# Patient Record
Sex: FEMALE | Race: BLACK OR AFRICAN AMERICAN | Employment: FULL TIME | URBAN - METROPOLITAN AREA
[De-identification: names, ages, dates, MRNs, and addresses within clinical notes are randomized per-mention and may not be internally consistent; named-entity substitution may affect disease eponyms.]

---

## 2017-08-14 ENCOUNTER — OFFICE VISIT (OUTPATIENT)
Dept: FAMILY MEDICINE | Facility: CLINIC | Age: 47
End: 2017-08-14
Payer: COMMERCIAL

## 2017-08-14 VITALS — SYSTOLIC BLOOD PRESSURE: 130 MMHG | DIASTOLIC BLOOD PRESSURE: 89 MMHG | TEMPERATURE: 96.5 F

## 2017-08-14 DIAGNOSIS — Z71.84 TRAVEL ADVICE ENCOUNTER: Primary | ICD-10-CM

## 2017-08-14 DIAGNOSIS — Z23 NEED FOR VACCINATION: ICD-10-CM

## 2017-08-14 PROCEDURE — 90734 MENACWYD/MENACWYCRM VACC IM: CPT | Mod: GA | Performed by: NURSE PRACTITIONER

## 2017-08-14 PROCEDURE — 90632 HEPA VACCINE ADULT IM: CPT | Mod: GA | Performed by: NURSE PRACTITIONER

## 2017-08-14 PROCEDURE — 90691 TYPHOID VACCINE IM: CPT | Mod: GA | Performed by: NURSE PRACTITIONER

## 2017-08-14 PROCEDURE — 90471 IMMUNIZATION ADMIN: CPT | Mod: GA | Performed by: NURSE PRACTITIONER

## 2017-08-14 PROCEDURE — 99402 PREV MED CNSL INDIV APPRX 30: CPT | Mod: 25 | Performed by: NURSE PRACTITIONER

## 2017-08-14 PROCEDURE — 90717 YELLOW FEVER VACCINE SUBQ: CPT | Mod: GA | Performed by: NURSE PRACTITIONER

## 2017-08-14 PROCEDURE — 90472 IMMUNIZATION ADMIN EACH ADD: CPT | Mod: GA | Performed by: NURSE PRACTITIONER

## 2017-08-14 RX ORDER — ATOVAQUONE AND PROGUANIL HYDROCHLORIDE 250; 100 MG/1; MG/1
1 TABLET, FILM COATED ORAL DAILY
Qty: 30 TABLET | Refills: 0 | Status: SHIPPED | OUTPATIENT
Start: 2017-08-14

## 2017-08-14 RX ORDER — LORAZEPAM 0.5 MG/1
0.25-0.5 TABLET ORAL EVERY 8 HOURS PRN
Qty: 5 TABLET | Refills: 0 | Status: SHIPPED | OUTPATIENT
Start: 2017-08-14

## 2017-08-14 NOTE — MR AVS SNAPSHOT
After Visit Summary   8/14/2017    Dame Da    MRN: [id number]           Patient Information     Date Of Birth          1970        Visit Information        Provider Department      8/14/2017 12:30 PM Jessica Taylor APRN CNP [facility]        Today's Diagnoses     Travel advice encounter    -  1    Need for vaccination          Care Instructions    Today August 14, 2017 you received the    Hepatitis A Vaccine - Please return on 2/10/18 or later for your 2nd and final dose.    Yellow Fever (YF)    Meningococcal (Menactra) Vaccine    Typhoid - injectable. This vaccine is valid for two years.   .    These appointments can be made as a NURSE ONLY visit.    **It is very important for the vaccinations to be given on the scheduled day(s), this helps ensure you receive the full effectiveness of the vaccine.**    Please call [facility] with any questions [phone]    Thank you for visiting [facility]              Follow-ups after your visit        Who to contact     If you have questions or need follow up information about [facility]'s clinic visit or your schedule please contact [facility] directly at [phone].  Normal or non-critical lab and imaging results will be communicated to you by MyChart, letter or phone within 4 business days after the clinic has received the results. If you do not hear from us within 7 days, please contact the clinic through MyChart or phone. If you have a critical or abnormal lab result, we will notify you by phone as soon as possible.  Submit refill requests through [employer] or call your pharmacy and they will forward the refill request to us. Please allow 3 business days for your refill to be completed.          Additional Information About Your Visit        MyChart Information     [employer] lets you send messages to your doctor, view your test results, renew your prescriptions, schedule appointments and  "more. To sign up, go to [email or web address] . Click on \"Log in\" on the left side of the screen, which will take you to the Welcome page. Then click on \"Sign up Now\" on the right side of the page.     You will be asked to enter the access code listed below, as well as some personal information. Please follow the directions to create your username and password.     Your access code is: [email or web address]  Expires: 2017  1:39 PM     Your access code will  in 90 days. If you need help or a new code, please call your [facility] clinic or [phone].        Care EveryWhere ID     This is your Care EveryWhere ID. This could be used by other organizations to access your [facility] medical records  [id number]        Your Vitals Were     Temperature Last Period Breastfeeding?             96.5  F (35.8  C) (Oral) 2017 (Approximate) No          Blood Pressure from Last 3 Encounters:   17 130/89   12 119/72   11 127/75    Weight from Last 3 Encounters:   11 116 lb (52.6 kg)              We Performed the Following     C YELLOW FEVER IMMUNIZATION, LIVE, SQ (STAMARIL)     HEPA VACCINE ADULT IM     MENINGOCOCCAL VACCINE,IM (MENACTRA)     TYPHOID VACCINE, IM          Today's Medication Changes          These changes are accurate as of: 17  1:39 PM.  If you have any questions, ask your nurse or doctor.               Start taking these medicines.        Dose/Directions    atovaquone-proguanil 250-100 MG per tablet   Commonly known as:  MALARONE   Used for:  Travel advice encounter   Started by:  Jessica Taylor APRN CNP        Dose:  1 tablet   Take 1 tablet by mouth daily Start 2 days before exposure to Malaria and continue daily till  7 days after exposure.   Quantity:  30 tablet   Refills:  0            Where to get your medicines      These medications were sent to [facility] - [place], [place] - [address] AT [facility] [address], [facility] " [address]     Phone:  [phone]     atovaquone-proguanil 250-100 MG per tablet                Primary Care Provider    Physician No Ref-Primary       No address on file        Equal Access to Services     NEO STEVENSVIOLET : Jarrell karoline hull nata Meehan, wamoyda luqjeanne, jesusybta kaantonia perkins, mariola friedmanlinda anselmo. So [facility] [phone].    ATENCIÓN: Si habla español, tiene a thomas disposición servicios gratuitos de asistencia lingüística. Llame al [phone].    We comply with applicable federal civil rights laws and [place] laws. We do not discriminate on the basis of race, color, national origin, age, disability sex, sexual orientation or gender identity.            Thank you!     Thank you for choosing [facility]  for your care. Our goal is always to provide you with excellent care. Hearing back from our patients is one way we can continue to improve our services. Please take a few minutes to complete the written survey that you may receive in the mail after your visit with us. Thank you!             Your Updated Medication List - Protect others around you: Learn how to safely use, store and throw away your medicines at www.disposemymeds.org.          This list is accurate as of: 8/14/17  1:39 PM.  Always use your most recent med list.                   Brand Name Dispense Instructions for use Diagnosis    atovaquone-proguanil 250-100 MG per tablet    MALARONE    30 tablet    Take 1 tablet by mouth daily Start 2 days before exposure to Malaria and continue daily till  7 days after exposure.    Travel advice encounter       MIDOL PO      Take  by mouth.        PNV PO      Take  by mouth.        TYLENOL PO      Take 500 mg by mouth as needed.

## 2017-08-14 NOTE — PROGRESS NOTES
Nurse Note      Itinerary:  [facility]       Departure Date: 08/16/2017       Return Date: 10/18/2017      Length of Trip 2 months      Reason for Travel: Pleasure           Urban or rural: both      Accommodations: Family home        IMMUNIZATION HISTORY  Have you received any immunizations within the past 4 weeks?  No  Have you ever fainted from having your blood drawn or from an injection?  No  Have you ever had a fever reaction to vaccination?  No  Have you ever had any bad reaction or side effect from any vaccination?  No  Have you ever had hepatitis A or B vaccine?  No  Do you live (or work closely) with anyone who has AIDS, an AIDS-like condition, any other immune disorder or who is on chemotherapy for cancer?  No  Do you have a family history of immunodeficiency?  No  Have you received any injection of immune globulin or any blood products during the past 12 months?  No    Patient roomed by SAÚL Dietrich  Dame Da is a 46 year old female seen today with spouse  with child for counsultation for international travel to [facility] for [facility]  Visiting friends and relatives.  Patient will be departing in  2 day(s) and staying for   2 month(s) and  traveling with family member(s).      Patient itinerary :  will be in the urban region of [place] and possibly to other regions which presents risk for Malaria, Yellow Fever, Dengue Fever, Chikungungya, Zika,  Trypanosomiasis, Schistosomiasis, Rabies, Ebola, food borne illnesses, motor vehicle accidents, Typhoid, Leishmaniasis and Lassa Fever. exposure.      Patient's activities will include visiting friends and relatives.    Patient's country of birth is [facility],     Special medical concerns: Anxiety/depression  Pre-travel questionnaire was completed by patient and reviewed by provider.     Vitals: /89  Temp 96.5  F (35.8  C) (Oral)  LMP 08/01/2017 (Approximate)  Breastfeeding? No  BMI= There is no height or weight on file to calculate  BMI.    EXAM:  General:  Well-nourished, well-developed in no acute distress.  Appears to be stated age, interacts appropriately and expresses understanding of information given to patient.    Current Outpatient Prescriptions   Medication Sig Dispense Refill     atovaquone-proguanil (MALARONE) 250-100 MG per tablet Take 1 tablet by mouth daily Start 2 days before exposure to Malaria and continue daily till  7 days after exposure. 30 tablet 0     LORazepam (ATIVAN) 0.5 MG tablet Take 0.5-1 tablets (0.25-0.5 mg) by mouth every 8 hours as needed for anxiety Take 30 minutes prior to departure.  Do not operate a vehicle after taking this medication 5 tablet 0     Ibuprofen (MIDOL PO) Take  by mouth.       Acetaminophen (TYLENOL PO) Take 500 mg by mouth as needed.       Prenatal Vit w/Od-Mbcuvtysz-BH (PNV PO) Take  by mouth.       There is no problem list on file for this patient.    No Known Allergies      Immunizations discussed include:   Hepatitis A:  Ordered/given today, risks, benefits and side effects reviewed  Hepatitis B: Declined  reviewed managment of a animal bite or scratch (washing wound, seek medical care within 24 hours for post exposure prophylaxis )  Influenza: vaccine is not available  Typhoid: Ordered/given today, risks, benefits and side effects reviewed  Rabies: Insufficient time to vaccinate  Yellow Fever: Stamaril   Jap ephalitis: Not indicated  Meningococcus: Ordered/given today, risks, benefits and side effects reviewed  Tetanus/Diphtheria: Up to date  Measles/Mumps/Rubella: Up to date  Cholera: Not needed  Polio: Up to date  Pneumococcal: Under age of 65  Varicella: Up to date  Zostavax:  Not indicated  HPV:  Not indicated  TB:  Consider post travel     Stamaril Informed Consent    The patient was provided with a copy of the IRB-approved consent form and all questions were answered before the patient agreed to participate by signing the informed consent document.   A copy of the form was  provided to the patient.    Date: 08/14/2017  Consent Version Date: 05/10/2017  Consent Obtained by:  ELEAZAR ritchie NP  HIPAA:  Yes  HIPAA Authorization Signed Date: 08/14/2017      Inclusion/Exclusion Criteria:    (Similar to Yellow Fever-VAX)      The patient met all of the following inclusion criteria in order to be eligible for the Stamaril vaccination under this EAP (Expanded Access Investigational New Drug Program)           At increased risk for YF, including researchers, laboratory workers, vaccine production staff, and those who are traveling within 30 days to a YF-endemic region or to a country requiring proof of YF vaccination under IHRs (International Health Regulations)?       Yes     Patient is greater than or equal to 9 months of age on the day of vaccination?     Yes     Patient is greater than or equal to 18 years of age and signed and dated the Consent Forms?     Yes     Patient is < 18 years of age and parent(s)/guardian(s) signed and dated the Consent Forms?      Patient is 7 years to < 18 years of age and signed and dated the Assent form?        No Assent is required.  Patient is <7 years of age.     No      No      N/A     The patient did not meet any of the following criteria that would have excluded the patient from receiving the Stamaril vaccination under this EAP              Patient is less than 9 months of age.       No     The patient is breast-feeding and cannot stop nursing for at least 14 days after vaccination.    Note: Yellow Fever vaccine virus may be transmissible via breast milk by nursing mothers who are vaccinated during the final 2 weeks of pregnancy or post-partum.   Following transmission, infants may develop encephalitis.  The minimum time of discontinuation of breastfeeding for 14 days after vaccination is based on the expected clearance of live-attenuated vaccine virus.       No     The patient is immunosuppressed, whether congenital or idiopathic, including for example,  leukemia, lymphoma, other malignancies, and patients who are receiving immunosuppressant medications (e.g. Systemic corticosteroids [greater than the standard dose of topical or inhaled steroids], alkylating drugs, antimetabolites, of other cytotoxic or immunomodulatory drugs) or radiation therapy or organ transplantation.       No     The patient has known hypersensitivity to the active substance or to any of the excipients of Stamaril vaccine or to eggs or chicken proteins.     No     The patient is symptomatic for human immunodeficiency virus (HIV) infection     No     The patient is asymptomatic for HIV infection but accompanied by evidence of severe immune suppression    Note:  Evidence of severe immune suppression includes CD4+ T-cell counts < 200 cubic millimeters (or < 15% total lymphocytes in children aged < 6 years), or as determined by the health care provider.       No     The patient has a history of thymus dysfunction (including myasthenia gravis, thymoma, thymectomy)     No     Moderate or severe febrile illness or acute illness    Note: Participation in the EAP can be reassessed when moderate or severe febrile illness or acute illness has resolved.       No         Altitude Exposure on this trip: no  Past tolerance to Altitude: na    ASSESSMENT/PLAN:    ICD-10-CM    1. Travel advice encounter Z71.89 C YELLOW FEVER IMMUNIZATION, LIVE, SQ (STAMARIL)     TYPHOID VACCINE, IM     HEPA VACCINE ADULT IM     MENINGOCOCCAL VACCINE,IM (MENACTRA)     atovaquone-proguanil (MALARONE) 250-100 MG per tablet     LORazepam (ATIVAN) 0.5 MG tablet   2. Need for vaccination Z23 C YELLOW FEVER IMMUNIZATION, LIVE, SQ (STAMARIL)     TYPHOID VACCINE, IM     HEPA VACCINE ADULT IM     MENINGOCOCCAL VACCINE,IM (MENACTRA)     I have reviewed general recommendations for safe travel   including: food/water precautions, insect precautions, safer sex   practices given high prevalence of Zika, HIV and other STDs,   roadway safety.  Educational materials and [employer] report provided.    Malaraia prophylaxis recommended: Malarone  Symptomatic treatment for traveler's diarrhea: azithromycin  Altitude illness prevention and treatment: no      Evacuation insurance advised and resources were provided to patient.    Total visit time 30 minutes  with over 50% of time spent counseling patient as detailed above.    Jessica Taylor CNP

## 2017-08-14 NOTE — PATIENT INSTRUCTIONS
Today August 14, 2017 you received the    Hepatitis A Vaccine - Please return on 2/10/18 or later for your 2nd and final dose.    Yellow Fever (YF)    Meningococcal (Menactra) Vaccine    Typhoid - injectable. This vaccine is valid for two years.   .    These appointments can be made as a NURSE ONLY visit.    **It is very important for the vaccinations to be given on the scheduled day(s), this helps ensure you receive the full effectiveness of the vaccine.**    Please call [facility] with any questions [phone]    Thank you for visiting [facility]

## 2020-03-18 ENCOUNTER — HOSPITAL ENCOUNTER (EMERGENCY)
Facility: CLINIC | Age: 50
Discharge: HOME | End: 2020-03-18
Attending: FAMILY MEDICINE | Admitting: FAMILY MEDICINE
Payer: COMMERCIAL

## 2020-03-18 ENCOUNTER — APPOINTMENT (OUTPATIENT)
Dept: CT IMAGING | Facility: CLINIC | Age: 50
End: 2020-03-18
Attending: FAMILY MEDICINE
Payer: COMMERCIAL

## 2020-03-18 VITALS
OXYGEN SATURATION: 100 % | WEIGHT: 122 LBS | RESPIRATION RATE: 17 BRPM | SYSTOLIC BLOOD PRESSURE: 142 MMHG | BODY MASS INDEX: 23.05 KG/M2 | TEMPERATURE: 98.1 F | DIASTOLIC BLOOD PRESSURE: 82 MMHG

## 2020-03-18 DIAGNOSIS — G44.209 ACUTE NON INTRACTABLE TENSION-TYPE HEADACHE: ICD-10-CM

## 2020-03-18 DIAGNOSIS — R03.0 ELEVATED BLOOD PRESSURE READING WITHOUT DIAGNOSIS OF HYPERTENSION: ICD-10-CM

## 2020-03-18 LAB
ALBUMIN SERPL-MCNC: 3.8 G/DL (ref 3.4–5)
ALP SERPL-CCNC: 65 U/L (ref 40–150)
ALT SERPL W P-5'-P-CCNC: 27 U/L (ref 0–50)
ANION GAP SERPL CALCULATED.3IONS-SCNC: 4 MMOL/L (ref 3–14)
AST SERPL W P-5'-P-CCNC: 19 U/L (ref 0–45)
BASOPHILS # BLD AUTO: 0 10E9/L (ref 0–0.2)
BASOPHILS NFR BLD AUTO: 0.2 %
BILIRUB SERPL-MCNC: 0.3 MG/DL (ref 0.2–1.3)
BUN SERPL-MCNC: 8 MG/DL (ref 7–30)
CALCIUM SERPL-MCNC: 9.4 MG/DL (ref 8.5–10.1)
CHLORIDE SERPL-SCNC: 107 MMOL/L (ref 94–109)
CO2 SERPL-SCNC: 31 MMOL/L (ref 20–32)
CREAT SERPL-MCNC: 0.55 MG/DL (ref 0.52–1.04)
DIFFERENTIAL METHOD BLD: ABNORMAL
EOSINOPHIL # BLD AUTO: 0.1 10E9/L (ref 0–0.7)
EOSINOPHIL NFR BLD AUTO: 3 %
ERYTHROCYTE [DISTWIDTH] IN BLOOD BY AUTOMATED COUNT: 16.7 % (ref 10–15)
ERYTHROCYTE [SEDIMENTATION RATE] IN BLOOD BY WESTERGREN METHOD: 12 MM/H (ref 0–20)
GFR SERPL CREATININE-BSD FRML MDRD: >90 ML/MIN/{1.73_M2}
GLUCOSE SERPL-MCNC: 85 MG/DL (ref 70–99)
HCG UR QL: NEGATIVE
HCT VFR BLD AUTO: 40.6 % (ref 35–47)
HGB BLD-MCNC: 12.9 G/DL (ref 11.7–15.7)
IMM GRANULOCYTES # BLD: 0 10E9/L (ref 0–0.4)
IMM GRANULOCYTES NFR BLD: 0.2 %
INTERNAL QC OK POCT: YES
LYMPHOCYTES # BLD AUTO: 1.9 10E9/L (ref 0.8–5.3)
LYMPHOCYTES NFR BLD AUTO: 42.4 %
MCH RBC QN AUTO: 27.9 PG (ref 26.5–33)
MCHC RBC AUTO-ENTMCNC: 31.8 G/DL (ref 31.5–36.5)
MCV RBC AUTO: 88 FL (ref 78–100)
MONOCYTES # BLD AUTO: 0.2 10E9/L (ref 0–1.3)
MONOCYTES NFR BLD AUTO: 4.6 %
NEUTROPHILS # BLD AUTO: 2.2 10E9/L (ref 1.6–8.3)
NEUTROPHILS NFR BLD AUTO: 49.6 %
NRBC # BLD AUTO: 0 10*3/UL
NRBC BLD AUTO-RTO: 0 /100
PLATELET # BLD AUTO: 183 10E9/L (ref 150–450)
POTASSIUM SERPL-SCNC: 3.7 MMOL/L (ref 3.4–5.3)
PROT SERPL-MCNC: 7.5 G/DL (ref 6.8–8.8)
RBC # BLD AUTO: 4.63 10E12/L (ref 3.8–5.2)
SODIUM SERPL-SCNC: 142 MMOL/L (ref 133–144)
TROPONIN I SERPL-MCNC: <0.015 UG/L (ref 0–0.04)
WBC # BLD AUTO: 4.4 10E9/L (ref 4–11)

## 2020-03-18 PROCEDURE — 96361 HYDRATE IV INFUSION ADD-ON: CPT | Performed by: FAMILY MEDICINE

## 2020-03-18 PROCEDURE — 85025 COMPLETE CBC W/AUTO DIFF WBC: CPT | Performed by: FAMILY MEDICINE

## 2020-03-18 PROCEDURE — 81025 URINE PREGNANCY TEST: CPT | Performed by: FAMILY MEDICINE

## 2020-03-18 PROCEDURE — 99285 EMERGENCY DEPT VISIT HI MDM: CPT | Mod: 25 | Performed by: FAMILY MEDICINE

## 2020-03-18 PROCEDURE — 80053 COMPREHEN METABOLIC PANEL: CPT | Performed by: FAMILY MEDICINE

## 2020-03-18 PROCEDURE — 96374 THER/PROPH/DIAG INJ IV PUSH: CPT | Performed by: FAMILY MEDICINE

## 2020-03-18 PROCEDURE — 25000128 H RX IP 250 OP 636: Performed by: FAMILY MEDICINE

## 2020-03-18 PROCEDURE — 84484 ASSAY OF TROPONIN QUANT: CPT | Performed by: FAMILY MEDICINE

## 2020-03-18 PROCEDURE — 99284 EMERGENCY DEPT VISIT MOD MDM: CPT | Mod: Z6 | Performed by: FAMILY MEDICINE

## 2020-03-18 PROCEDURE — 96376 TX/PRO/DX INJ SAME DRUG ADON: CPT | Performed by: FAMILY MEDICINE

## 2020-03-18 PROCEDURE — 70450 CT HEAD/BRAIN W/O DYE: CPT

## 2020-03-18 PROCEDURE — 96375 TX/PRO/DX INJ NEW DRUG ADDON: CPT | Performed by: FAMILY MEDICINE

## 2020-03-18 PROCEDURE — 85652 RBC SED RATE AUTOMATED: CPT | Performed by: FAMILY MEDICINE

## 2020-03-18 PROCEDURE — 25800030 ZZH RX IP 258 OP 636: Performed by: FAMILY MEDICINE

## 2020-03-18 RX ORDER — METOCLOPRAMIDE HYDROCHLORIDE 5 MG/ML
5 INJECTION INTRAMUSCULAR; INTRAVENOUS ONCE
Status: COMPLETED | OUTPATIENT
Start: 2020-03-18 | End: 2020-03-18

## 2020-03-18 RX ORDER — KETOROLAC TROMETHAMINE 15 MG/ML
15 INJECTION, SOLUTION INTRAMUSCULAR; INTRAVENOUS ONCE
Status: COMPLETED | OUTPATIENT
Start: 2020-03-18 | End: 2020-03-18

## 2020-03-18 RX ORDER — HYDROMORPHONE HCL/0.9% NACL/PF 0.2MG/0.2
0.2 SYRINGE (ML) INTRAVENOUS ONCE
Status: COMPLETED | OUTPATIENT
Start: 2020-03-18 | End: 2020-03-18

## 2020-03-18 RX ORDER — DEXAMETHASONE SODIUM PHOSPHATE 10 MG/ML
10 INJECTION, SOLUTION INTRAMUSCULAR; INTRAVENOUS ONCE
Status: COMPLETED | OUTPATIENT
Start: 2020-03-18 | End: 2020-03-18

## 2020-03-18 RX ORDER — SODIUM CHLORIDE 9 MG/ML
1000 INJECTION, SOLUTION INTRAVENOUS CONTINUOUS
Status: DISCONTINUED | OUTPATIENT
Start: 2020-03-18 | End: 2020-03-18 | Stop reason: HOSPADM

## 2020-03-18 RX ADMIN — KETOROLAC TROMETHAMINE 15 MG: 15 INJECTION, SOLUTION INTRAMUSCULAR; INTRAVENOUS at 13:44

## 2020-03-18 RX ADMIN — SODIUM CHLORIDE 1000 ML: 9 INJECTION, SOLUTION INTRAVENOUS at 11:41

## 2020-03-18 RX ADMIN — METOCLOPRAMIDE 5 MG: 5 INJECTION, SOLUTION INTRAMUSCULAR; INTRAVENOUS at 11:42

## 2020-03-18 RX ADMIN — Medication 0.2 MG: at 13:52

## 2020-03-18 RX ADMIN — DEXAMETHASONE SODIUM PHOSPHATE 10 MG: 10 INJECTION, SOLUTION INTRAMUSCULAR; INTRAVENOUS at 13:44

## 2020-03-18 RX ADMIN — Medication 0.2 MG: at 11:42

## 2020-03-18 NOTE — ED TRIAGE NOTES
Pt arrives with headache that started on Friday, that continues to get worse. She has been taking tylonel and advil, but does not help. First time having a heachache like this.

## 2020-03-18 NOTE — ED AVS SNAPSHOT
[facility], [place], Emergency Department  [address]  [facility] [address]  Phone:  [phone]  Fax:  [phone]                                    Dame Da   MRN: [id number]    Department:  [facility], Emergency Department   Date of Visit:  3/18/2020           After Visit Summary Signature Page    I have received my discharge instructions, and my questions have been answered. I have discussed any challenges I see with this plan with the nurse or doctor.    ..........................................................................................................................................  Patient/Patient Representative Signature      ..........................................................................................................................................  Patient Representative Print Name and Relationship to Patient    ..................................................               ................................................  Date                                   Time    ..........................................................................................................................................  Reviewed by Signature/Title    ...................................................              ..............................................  Date                                               Time          22EPIC Rev 08/18

## 2020-03-18 NOTE — LETTER
March 18, 2020      To Whom It May Concern:      Dame Da was seen in our Emergency Department today, 03/18/20.  I expect her condition to improve over the next 1-2 days.  She may return to work/school when improved.    Sincerely,        Jose Hernandez MD

## 2020-03-18 NOTE — DISCHARGE INSTRUCTIONS
Thank you for choosing [facility].     Please closely monitor for further symptoms. Return to the Emergency Department if you develop any new or worsening signs or symptoms.    If you received any opiate pain medications or sedatives during your visit, please do not drive for at least 8 hours.     Labs, cultures or final xray interpretations may still need to be reviewed.  We will call you if your plan of care needs to be changed.    Please follow up with your primary care physician or clinic.

## 2020-03-18 NOTE — ED PROVIDER NOTES
[facility] [place] ([facility])    3/18/20        History     Chief Complaint   Patient presents with     Headache     Since friday, continues to get worse     The history is provided by the patient.     Dame Da is a 49 year old female with a history of HTN (stopped taking medication for a year), who presents to the Emergency Department for a headache that started on Friday (3/13). She reports that it was rapid onset, and right sided.  Progressively worsening.  Last night it became generalized. She has taken pain killers with no relief. She denies any neck stiffness, nausea, vomiting, fever, chills, rashes, or trauma to the head.  No syncope or near syncope.  She notes she has a history of similar headaches but nothing to the pain of this degree. Her LMP was 2020, and states that she has irregular periods but this one is late.     I have reviewed the Medications, Allergies, Past Medical and Surgical History, and Social History in the [employer] system.  PAST MEDICAL HISTORY: History reviewed. No pertinent past medical history.    PAST SURGICAL HISTORY:   Past Surgical History:   Procedure Laterality Date      SECTION  2011       FAMILY HISTORY: History reviewed. No pertinent family history.    SOCIAL HISTORY:   Social History     Tobacco Use     Smoking status: Never Smoker     Smokeless tobacco: Never Used   Substance Use Topics     Alcohol use: No       Patient's Medications   New Prescriptions    ASPIRIN-ACETAMINOPHEN-CAFFEINE (EXCEDRIN MIGRAINE) 250-250-65 MG TABLET    Take 1 tablet by mouth every 6 hours as needed for headaches   Previous Medications    ACETAMINOPHEN (TYLENOL PO)    Take 500 mg by mouth as needed.    ATOVAQUONE-PROGUANIL (MALARONE) 250-100 MG PER TABLET    Take 1 tablet by mouth daily Start 2 days before exposure to Malaria and continue daily till  7 days after exposure.    IBUPROFEN (MIDOL PO)    Take  by mouth.    LORAZEPAM (ATIVAN) 0.5 MG TABLET    Take  0.5-1 tablets (0.25-0.5 mg) by mouth every 8 hours as needed for anxiety Take 30 minutes prior to departure.  Do not operate a vehicle after taking this medication    PRENATAL VIT W/WD-YRNVCFRPB-TH (PNV PO)    Take  by mouth.   Modified Medications    No medications on file   Discontinued Medications    No medications on file        No Known Allergies     Review of Systems  ROS: 14 point ROS neg other than the symptoms noted above in the HPI.    Physical Exam   BP: (!) 169/99  Heart Rate: 63  Temp: 97.9  F (36.6  C)  Resp: 18  Weight: 55.3 kg (122 lb)  SpO2: 100 %      Physical Exam  Vitals signs and nursing note reviewed.   Constitutional:       General: She is not in acute distress.     Appearance: Normal appearance. She is not diaphoretic.   HENT:      Head: Atraumatic.      Nose: Nose normal.      Mouth/Throat:      Mouth: Mucous membranes are moist.      Pharynx: No oropharyngeal exudate.   Eyes:      General: No scleral icterus.     Pupils: Pupils are equal, round, and reactive to light.      Funduscopic exam:     Right eye: No papilledema.         Left eye: No papilledema.   Neck:      Musculoskeletal: Neck supple.   Cardiovascular:      Rate and Rhythm: Normal rate and regular rhythm.      Heart sounds: Normal heart sounds. No murmur.   Pulmonary:      Effort: No respiratory distress.      Breath sounds: Normal breath sounds.   Abdominal:      General: Bowel sounds are normal.      Palpations: Abdomen is soft.      Tenderness: There is no abdominal tenderness.   Musculoskeletal:         General: No tenderness.   Skin:     General: Skin is warm.      Findings: No rash.   Neurological:      Mental Status: She is alert and oriented to person, place, and time.      Cranial Nerves: Cranial nerves are intact.      Motor: Motor function is intact.      Coordination: Coordination is intact.      Gait: Gait is intact.      Deep Tendon Reflexes:      Reflex Scores:       Tricep reflexes are 2+ on the right side and 2+  on the left side.       Bicep reflexes are 2+ on the right side and 2+ on the left side.       Brachioradialis reflexes are 2+ on the right side and 2+ on the left side.       Patellar reflexes are 2+ on the right side and 2+ on the left side.       Achilles reflexes are 2+ on the right side and 2+ on the left side.        ED Course   11:09 AM  The patient was seen and examined by Jose Hernandez MD in Room ED02.        Procedures                           Results for orders placed or performed during the hospital encounter of 03/18/20 (from the past 24 hour(s))   CBC with platelets differential   Result Value Ref Range    WBC 4.4 4.0 - 11.0 10e9/L    RBC Count 4.63 3.8 - 5.2 10e12/L    Hemoglobin 12.9 11.7 - 15.7 g/dL    Hematocrit 40.6 35.0 - 47.0 %    MCV 88 78 - 100 fl    MCH 27.9 26.5 - 33.0 pg    MCHC 31.8 31.5 - 36.5 g/dL    RDW 16.7 (H) 10.0 - 15.0 %    Platelet Count 183 150 - 450 10e9/L    Diff Method Automated Method     % Neutrophils 49.6 %    % Lymphocytes 42.4 %    % Monocytes 4.6 %    % Eosinophils 3.0 %    % Basophils 0.2 %    % Immature Granulocytes 0.2 %    Nucleated RBCs 0 0 /100    Absolute Neutrophil 2.2 1.6 - 8.3 10e9/L    Absolute Lymphocytes 1.9 0.8 - 5.3 10e9/L    Absolute Monocytes 0.2 0.0 - 1.3 10e9/L    Absolute Eosinophils 0.1 0.0 - 0.7 10e9/L    Absolute Basophils 0.0 0.0 - 0.2 10e9/L    Abs Immature Granulocytes 0.0 0 - 0.4 10e9/L    Absolute Nucleated RBC 0.0    Comprehensive metabolic panel   Result Value Ref Range    Sodium 142 133 - 144 mmol/L    Potassium 3.7 3.4 - 5.3 mmol/L    Chloride 107 94 - 109 mmol/L    Carbon Dioxide 31 20 - 32 mmol/L    Anion Gap 4 3 - 14 mmol/L    Glucose 85 70 - 99 mg/dL    Urea Nitrogen 8 7 - 30 mg/dL    Creatinine 0.55 0.52 - 1.04 mg/dL    GFR Estimate >90 >60 mL/min/[1.73_m2]    GFR Estimate If Black >90 >60 mL/min/[1.73_m2]    Calcium 9.4 8.5 - 10.1 mg/dL    Bilirubin Total 0.3 0.2 - 1.3 mg/dL    Albumin 3.8 3.4 - 5.0 g/dL    Protein Total 7.5 6.8 -  8.8 g/dL    Alkaline Phosphatase 65 40 - 150 U/L    ALT 27 0 - 50 U/L    AST 19 0 - 45 U/L   Erythrocyte sedimentation rate auto   Result Value Ref Range    Sed Rate 12 0 - 20 mm/h   Troponin I   Result Value Ref Range    Troponin I ES <0.015 0.000 - 0.045 ug/L   hCG qual urine POCT   Result Value Ref Range    HCG Qual Urine Negative neg    Internal QC OK Yes    CT Head w/o Contrast    Narrative    CT SCAN OF THE HEAD WITHOUT CONTRAST   3/18/2020 12:26 PM     HISTORY: Headache, acute, severe, worst HA of life    TECHNIQUE:  Axial images of the head and coronal reformations without  IV contrast material. Radiation dose for this scan was reduced using  automated exposure control, adjustment of the mA and/or kV according  to patient size, or iterative reconstruction technique.    COMPARISON: None.    FINDINGS:     Intracranial contents: The ventricles are normal in size, shape and  configuration.  The brain parenchyma and subarachnoid spaces are  normal. There is no evidence of intracranial hemorrhage, mass, acute  infarct or anomaly.    Visualized orbits/sinuses/mastoids:  The visualized portions of the  sinuses and mastoids appear normal.    Osseous structures/soft tissues:  There is no evidence of trauma.      Impression    IMPRESSION: Negative. No acute pathology, no bleed, mass, or areas of  acute infarction are identified.       Medications   0.9% sodium chloride BOLUS ( Intravenous Restarted 3/18/20 1339)     Followed by   sodium chloride 0.9% infusion (has no administration in time range)   metoclopramide (REGLAN) injection 5 mg (5 mg Intravenous Given 3/18/20 1142)   HYDROmorphone (DILAUDID) injection 0.2 mg (0.2 mg Intravenous Given 3/18/20 1142)   ketorolac (TORADOL) injection 15 mg (15 mg Intravenous Given 3/18/20 1344)   dexamethasone PF (DECADRON) injection 10 mg (10 mg Intravenous Given 3/18/20 1344)   HYDROmorphone (DILAUDID) injection 0.2 mg (0.2 mg Intravenous Given 3/18/20 1352)              Assessments & Plan (with Medical Decision Making)   49-year-old woman presenting with the abrupt onset of a right-sided headache 4 days ago.  Differential diagnosis considered includes life threatening causes such as subarachnoid hemorrhage, CNS neoplasm, meningitis, carbon monoxide poisoning,cerebral venous thrombosis, pseudotumor cerebri, arterial dissection, temporal arteritis.   On exam she is initially somewhat hypertensive.  She does not appear in any distress, her mental status is normal.  Her funduscopic exam is normal.  Her neck is supple.  Cardiovascular exam normal.  Neurologic exam normal.  The remainder of a complete physical exam is normal.  Her labs are all normal, and her head CT negative..  She was treated with IV fluids, Reglan, and Dilaudid.  Subsequently received Decadron and Toradol.  She reported complete resolution of her headache.  Her blood pressure improved is now 140/70.  Though her headache was abrupt in its onset, there is no other clinical evidence to support a life-threatening etiology of her headache.  She has experienced similar headaches in the past and this may represent a muscle tension or migrainous headache.  With respect to her blood pressure she has experienced some borderline upper limits of normal blood pressure in the past, but will need to follow-up to confirm if she is actually beginning to manifest hypertension.  This may be related to her physical symptoms and the acute setting versus previously undiagnosed essential hypertension.  I doubt secondary hypertension.  Based on the clinical findings and the entire clinical scenario, the patient appears stable at this time to be treated symptomatically, and to follow-up as an outpatient for any further evaluation and treatment.  Discussed expected course, need for follow up, and indications for return with the patient.  See discharge instructions.      I have reviewed the nursing notes.    I have reviewed the findings,  diagnosis, plan and need for follow up with the patient.    New Prescriptions    ASPIRIN-ACETAMINOPHEN-CAFFEINE (EXCEDRIN MIGRAINE) 250-250-65 MG TABLET    Take 1 tablet by mouth every 6 hours as needed for headaches       Final diagnoses:   Acute non intractable tension-type headache   Elevated blood pressure reading without diagnosis of hypertension       3/18/2020   [facility], EMERGENCY DEPARTMENT    IJanusz, am serving as a trained medical scribe to document services personally performed by Jose Hernandez MD, based on the provider's statements to me.     IJose MD, was physically present and have reviewed and verified the accuracy of this note documented by Janusz Sawyer.       Jose Hernandez MD  03/18/20 [id number]

## 2021-01-07 ENCOUNTER — APPOINTMENT (OUTPATIENT)
Dept: GENERAL RADIOLOGY | Facility: CLINIC | Age: 51
End: 2021-01-07
Attending: INTERNAL MEDICINE
Payer: COMMERCIAL

## 2021-01-07 ENCOUNTER — HOSPITAL ENCOUNTER (EMERGENCY)
Facility: CLINIC | Age: 51
Discharge: HOME | End: 2021-01-07
Attending: INTERNAL MEDICINE | Admitting: INTERNAL MEDICINE
Payer: COMMERCIAL

## 2021-01-07 VITALS
OXYGEN SATURATION: 100 % | TEMPERATURE: 97.2 F | DIASTOLIC BLOOD PRESSURE: 96 MMHG | HEART RATE: 80 BPM | RESPIRATION RATE: 16 BRPM | SYSTOLIC BLOOD PRESSURE: 175 MMHG

## 2021-01-07 DIAGNOSIS — S69.92XA WRIST INJURY, LEFT, INITIAL ENCOUNTER: ICD-10-CM

## 2021-01-07 PROCEDURE — 99283 EMERGENCY DEPT VISIT LOW MDM: CPT | Mod: 25 | Performed by: INTERNAL MEDICINE

## 2021-01-07 PROCEDURE — 99284 EMERGENCY DEPT VISIT MOD MDM: CPT | Mod: 25

## 2021-01-07 PROCEDURE — 73110 X-RAY EXAM OF WRIST: CPT | Mod: LT

## 2021-01-07 PROCEDURE — 29125 APPL SHORT ARM SPLINT STATIC: CPT | Mod: LT | Performed by: INTERNAL MEDICINE

## 2021-01-07 PROCEDURE — 29125 APPL SHORT ARM SPLINT STATIC: CPT | Mod: LT

## 2021-01-07 RX ORDER — TRAMADOL HYDROCHLORIDE 50 MG/1
50 TABLET ORAL EVERY 6 HOURS PRN
Qty: 10 TABLET | Refills: 0 | Status: SHIPPED | OUTPATIENT
Start: 2021-01-07 | End: 2021-01-10

## 2021-01-07 ASSESSMENT — ENCOUNTER SYMPTOMS
HEADACHES: 0
NUMBNESS: 0

## 2021-01-07 NOTE — ED PROVIDER NOTES
ED Provider Note  [facility]      History     Chief Complaint   Patient presents with     Hand Pain     pt fell on L wrist, swollen and painful     The history is provided by the patient.     Dame Da is a 50 year old female without significant past medical history presents to the Emergency Department for evaluation of left wrist pain after a mechanical slip and fall.  Patient reports that she walked out of her front door and slipped on ice.  Patient fell onto her outstretched left hand and she has been having pain to the radial aspect of her left wrist since the fall.  She denies any numbness or tingling in her left hand.  Patient denies striking her head during the fall and denies any loss of consciousness.    Past Medical History  History reviewed. No pertinent past medical history.  Past Surgical History:   Procedure Laterality Date      SECTION  2011          aspirin-acetaminophen-caffeine (EXCEDRIN MIGRAINE) 250-250-65 MG tablet       atovaquone-proguanil (MALARONE) 250-100 MG per tablet       LORazepam (ATIVAN) 0.5 MG tablet       traMADol (ULTRAM) 50 MG tablet       Acetaminophen (TYLENOL PO)       Ibuprofen (MIDOL PO)       Prenatal Vit w/Oz-Wwrzwnmme-SV (PNV PO)      No Known Allergies  Family History  No family history on file.  Social History   Social History     Tobacco Use     Smoking status: Never Smoker     Smokeless tobacco: Never Used   Substance Use Topics     Alcohol use: No     Drug use: No      Past medical history, past surgical history, medications, allergies, family history, and social history were reviewed with the patient. No additional pertinent items.       Review of Systems   Constitutional: Negative for fever.   Respiratory: Negative for shortness of breath.    Cardiovascular: Negative for chest pain.   Gastrointestinal: Negative for abdominal pain.   Musculoskeletal:        Positive for left radial wrist pain   Neurological: Negative for  syncope, numbness and headaches.   All other systems reviewed and are negative.      Physical Exam   BP: (!) 180/100  Temp: 97.8  F (36.6  C)  Resp: 16  SpO2: 100 %  Physical Exam  Constitutional:       General: She is not in acute distress.     Appearance: She is not diaphoretic.   HENT:      Head: Atraumatic.      Mouth/Throat:      Pharynx: No oropharyngeal exudate.   Eyes:      General: No scleral icterus.     Pupils: Pupils are equal, round, and reactive to light.   Neck:      Musculoskeletal: Neck supple.   Cardiovascular:      Rate and Rhythm: Normal rate and regular rhythm.      Heart sounds: Normal heart sounds. No murmur. No friction rub. No gallop.    Pulmonary:      Effort: Pulmonary effort is normal. No respiratory distress.      Breath sounds: Normal breath sounds. No stridor. No wheezing, rhonchi or rales.   Chest:      Chest wall: No tenderness.   Abdominal:      General: Bowel sounds are normal.      Palpations: Abdomen is soft.      Tenderness: There is no abdominal tenderness.   Musculoskeletal:      Left wrist: She exhibits decreased range of motion, tenderness, bony tenderness and swelling. She exhibits no effusion, no crepitus, no deformity and no laceration.      Cervical back: She exhibits no tenderness.      Thoracic back: She exhibits no tenderness.      Lumbar back: She exhibits no tenderness.        Arms:    Skin:     General: Skin is warm.      Findings: No rash.   Neurological:      Mental Status: She is oriented to person, place, and time.         ED Course      [facility]     -Fracture    Date/Time: 1/8/2021 12:07 AM  Performed by: Charli Nielson MD  Authorized by: Charli Nielson MD       INJURY      Injury location:  Wrist    Wrist injury location:  L wrist    Wrist fracture type: scaphoid      ANESTHESIA (see MAR for exact dosages)      Anesthesia method:  None    PROCEDURE DETAILS:     Manipulation performed: no      Immobilization:   Splint    Splint type:  Thumb spica    Supplies used:  Ortho-Glass  PROCEDURE   Patient Tolerance:  Patient tolerated the procedure well with no immediate complications           5:27 PM  The patient was seen and examined by Charli Nielson MD in Room ED06.                Results for orders placed or performed during the hospital encounter of 01/07/21   XR Wrist Left G/E 3 Views     Status: None    Narrative    XR WRIST LEFT G/E 3 VIEWS   1/7/2021 6:46 PM     HISTORY:  fall pain      Impression    IMPRESSION: Unremarkable exam.    BETTY QUINTEROS MD     Medications - No data to display     Assessments & Plan (with Medical Decision Making)  Left wrist injury after FOOSH with strong snuff box tenderness suspected nondisplaced scaphoid fx, XR neg, thumb spica splint place and tramadol prn for pain, follow up with Ortho or Sports Med in one week.       I have reviewed the nursing notes. I have reviewed the findings, diagnosis, plan and need for follow up with the patient.    Discharge Medication List as of 1/7/2021  7:11 PM      START taking these medications    Details   traMADol (ULTRAM) 50 MG tablet Take 1 tablet (50 mg) by mouth every 6 hours as needed for severe pain, Disp-10 tablet, R-0, Local Print             Final diagnoses:   Wrist injury, left, initial encounter       --  I, Peterson Rose, am serving as a trained medical scribe to document services personally performed by Charli Nielson MD, based on the provider's statements to me.     ICharli MD, was physically present and have reviewed and verified the accuracy of this note documented by Peterson Rose.    Charli Nielson MD  [facility] EMERGENCY DEPARTMENT  1/7/2021     Charli Nielson MD  01/08/21 0009

## 2021-01-07 NOTE — ED AVS SNAPSHOT
[facility] Emergency Department  [address]  [place] [address]  Phone: [phone]  Fax: [phone]                                    Dame Da   MRN: [id number]    Department: [facility] Emergency Department   Date of Visit: 1/7/2021           After Visit Summary Signature Page    I have received my discharge instructions, and my questions have been answered. I have discussed any challenges I see with this plan with the nurse or doctor.    ..........................................................................................................................................  Patient/Patient Representative Signature      ..........................................................................................................................................  Patient Representative Print Name and Relationship to Patient    ..................................................               ................................................  Date                                   Time    ..........................................................................................................................................  Reviewed by Signature/Title    ...................................................              ..............................................  Date                                               Time          22EPIC Rev 08/18

## 2021-01-07 NOTE — LETTER
[facility] EMERGENCY DEPARTMENT  [address]  [place] [address]  [phone]      2021    Dame Da  [address] [id number] [phone] (home)     : 1970      To Whom it may concern:    Dame Da was seen in our Emergency Department today, 2021 for an injury.      For the next 7 days she should not use her left hand.    The employee might be taking medication so that they cannot operate moving machinery or perform activities that require balancing or working above ground.      Sincerely,    Charli Nielson MD, MD

## 2021-01-08 ASSESSMENT — ENCOUNTER SYMPTOMS
SHORTNESS OF BREATH: 0
ABDOMINAL PAIN: 0
FEVER: 0

## 2021-01-08 NOTE — DISCHARGE INSTRUCTIONS
Please make an appointment to follow up with Orthopedics Clinic (phone: [phone]) or Sports Medicine (phone: [phone]) in 5-10 days even if entirely better.

## 2021-06-03 ENCOUNTER — RECORDS - HEALTHEAST (OUTPATIENT)
Dept: ADMINISTRATIVE | Facility: CLINIC | Age: 51
End: 2021-06-03